# Patient Record
Sex: MALE | Race: WHITE | NOT HISPANIC OR LATINO
[De-identification: names, ages, dates, MRNs, and addresses within clinical notes are randomized per-mention and may not be internally consistent; named-entity substitution may affect disease eponyms.]

---

## 2024-05-15 ENCOUNTER — DASHBOARD ENCOUNTERS (OUTPATIENT)
Age: 34
End: 2024-05-15

## 2024-05-15 ENCOUNTER — OFFICE VISIT (OUTPATIENT)
Dept: URBAN - METROPOLITAN AREA CLINIC 70 | Facility: CLINIC | Age: 34
End: 2024-05-15
Payer: COMMERCIAL

## 2024-05-15 ENCOUNTER — LAB OUTSIDE AN ENCOUNTER (OUTPATIENT)
Dept: URBAN - METROPOLITAN AREA CLINIC 70 | Facility: CLINIC | Age: 34
End: 2024-05-15

## 2024-05-15 VITALS
WEIGHT: 161.4 LBS | SYSTOLIC BLOOD PRESSURE: 127 MMHG | HEIGHT: 66 IN | TEMPERATURE: 97.6 F | DIASTOLIC BLOOD PRESSURE: 73 MMHG | BODY MASS INDEX: 25.94 KG/M2 | HEART RATE: 66 BPM

## 2024-05-15 DIAGNOSIS — R93.89 ABNORMAL CT SCAN: ICD-10-CM

## 2024-05-15 DIAGNOSIS — K76.9 LIVER LESION: ICD-10-CM

## 2024-05-15 PROBLEM — 129679001: Status: ACTIVE | Noted: 2024-05-15

## 2024-05-15 PROBLEM — 300331000: Status: ACTIVE | Noted: 2024-05-15

## 2024-05-15 PROCEDURE — 99203 OFFICE O/P NEW LOW 30 MIN: CPT | Performed by: NURSE PRACTITIONER

## 2024-05-17 LAB
AFP, SERUM, TUMOR MARKER: 5.5
ALBUMIN/GLOBULIN RATIO: 2
ALBUMIN: 4.4
ALKALINE PHOSPHATASE: 76
ALT (SGPT): 12
AST (SGOT): 12
BILIRUBIN, DIRECT: 0.2
BILIRUBIN, INDIRECT: 0.7
BILIRUBIN, TOTAL: 0.9
GLOBULIN: 2.2
PROTEIN, TOTAL: 6.6

## 2024-06-17 ENCOUNTER — OFFICE VISIT (OUTPATIENT)
Dept: URBAN - METROPOLITAN AREA CLINIC 70 | Facility: CLINIC | Age: 34
End: 2024-06-17
Payer: COMMERCIAL

## 2024-06-17 VITALS
HEIGHT: 66 IN | BODY MASS INDEX: 25.23 KG/M2 | TEMPERATURE: 97.7 F | WEIGHT: 157 LBS | HEART RATE: 60 BPM | SYSTOLIC BLOOD PRESSURE: 116 MMHG | DIASTOLIC BLOOD PRESSURE: 72 MMHG

## 2024-06-17 DIAGNOSIS — K76.89 ABNORMAL FINDING ON LIVER FUNCTION: ICD-10-CM

## 2024-06-17 PROCEDURE — 99212 OFFICE O/P EST SF 10 MIN: CPT | Performed by: NURSE PRACTITIONER

## 2024-06-17 NOTE — HPI-OTHER HISTORIES
05/15/24: PT. presents for follow up after an ER visit on 5/8/24 for epigastric pain x 3 days with some associated nausea without vomiting. CT in ER showed a hypodense lesion on the liver measuring 1.7 cm, no other acute intraabdominal finding. Symptoms resolved, denies nausea, vomiting, or pain at this time. Denies ETOH, smoking, or acetaminophen overuse.

## 2024-06-17 NOTE — HPI-TODAY'S VISIT:
06/17/2024: Patient presents for follow up for abnormal CT, lesion on the liver. Denies any C/O.  MRI: mild splenomegaly and  1.4 x 1.6 cm left lobe hemangioma. AFP and hepatic panel normal Problem: Serum Glucose Level - Abnormal:  Goal: Ability to maintain appropriate glucose levels will improve  Description: Ability to maintain appropriate glucose levels will improve  Outcome: Ongoing   Pt educated on the importance of a carb control diet. Monitoring pt's blood glucose AC/HS. Sliding scale insulin given as ordered according to pt's blood glucose. Will continue to monitor.